# Patient Record
Sex: FEMALE | Race: WHITE | URBAN - METROPOLITAN AREA
[De-identification: names, ages, dates, MRNs, and addresses within clinical notes are randomized per-mention and may not be internally consistent; named-entity substitution may affect disease eponyms.]

---

## 2022-12-27 ENCOUNTER — OFFICE VISIT (OUTPATIENT)
Dept: URBAN - METROPOLITAN AREA CLINIC 30 | Facility: CLINIC | Age: 62
End: 2022-12-27
Payer: COMMERCIAL

## 2022-12-27 DIAGNOSIS — H02.534 EYELID RETRACTION LEFT UPPER EYELID: Primary | ICD-10-CM

## 2022-12-27 PROCEDURE — 92285 EXTERNAL OCULAR PHOTOGRAPHY: CPT | Performed by: OPHTHALMOLOGY

## 2022-12-27 PROCEDURE — 99214 OFFICE O/P EST MOD 30 MIN: CPT | Performed by: OPHTHALMOLOGY

## 2022-12-27 NOTE — IMPRESSION/PLAN
Impression: Eyelid retraction left upper eyelid: H02.534. Plan: HANNAH retraction following trauma and reconstruction at [facility] in late November 2022. 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 Moderate exposure OS. Recommend FTSG to HANNAH from RUL to replace lost anterior lamella. RBAI of surgery d/w patient. All questions answered. cont' PFAT's every 1-2 hours OS and tear gel brandi TID.

## 2023-03-07 ENCOUNTER — ADULT PHYSICAL (OUTPATIENT)
Dept: URBAN - METROPOLITAN AREA CLINIC 24 | Facility: CLINIC | Age: 63
End: 2023-03-07
Payer: COMMERCIAL

## 2023-03-07 DIAGNOSIS — H02.534 EYELID RETRACTION LEFT UPPER EYELID: ICD-10-CM

## 2023-03-07 DIAGNOSIS — Z01.818 ENCOUNTER FOR OTHER PREPROCEDURAL EXAMINATION: Primary | ICD-10-CM

## 2023-03-07 PROCEDURE — 99203 OFFICE O/P NEW LOW 30 MIN: CPT | Performed by: PHYSICIAN ASSISTANT

## 2023-03-23 ENCOUNTER — POST-OPERATIVE VISIT (OUTPATIENT)
Dept: URBAN - METROPOLITAN AREA CLINIC 24 | Facility: CLINIC | Age: 63
End: 2023-03-23
Payer: COMMERCIAL

## 2023-03-23 DIAGNOSIS — Z48.89 ENCOUNTER FOR OTHER SPECIFIED SURGICAL AFTERCARE: Primary | ICD-10-CM

## 2023-03-23 PROCEDURE — 99024 POSTOP FOLLOW-UP VISIT: CPT | Performed by: OPHTHALMOLOGY

## 2023-03-23 NOTE — IMPRESSION/PLAN
Impression: S/P Full thickness Eyelid graft- Left Upper Lid OS - 6 Days. Encounter for other specified surgical aftercare  Z48.89. Plan: healing well. no signs of infection. graft alive. Post-op instructions reviewed. plastic shield at all times. 

RTC 10-14 days or sooner PRN for suture removal.

## 2023-04-06 ENCOUNTER — POST-OPERATIVE VISIT (OUTPATIENT)
Dept: URBAN - METROPOLITAN AREA CLINIC 24 | Facility: CLINIC | Age: 63
End: 2023-04-06
Payer: COMMERCIAL

## 2023-04-06 DIAGNOSIS — Z48.89 ENCOUNTER FOR OTHER SPECIFIED SURGICAL AFTERCARE: Primary | ICD-10-CM

## 2023-04-06 PROCEDURE — 99024 POSTOP FOLLOW-UP VISIT: CPT | Performed by: OPHTHALMOLOGY

## 2023-04-06 RX ORDER — ERYTHROMYCIN 5 MG/G
OINTMENT OPHTHALMIC
Qty: 2 | Refills: 0 | Status: ACTIVE
Start: 2023-04-06

## 2023-04-06 NOTE — IMPRESSION/PLAN
Impression: S/P Full thickness Eyelid graft- Left Upper Lid OS - 20 Days. Encounter for other specified surgical aftercare  Z48.89. Plan: healing well. mild nodularity along graft border. Will likely require kenalog and 5-FU injection. RTC 3 weeks or sooner PRN. RUL sutures removed today.

## 2023-05-04 ENCOUNTER — POST-OPERATIVE VISIT (OUTPATIENT)
Dept: URBAN - METROPOLITAN AREA CLINIC 24 | Facility: CLINIC | Age: 63
End: 2023-05-04
Payer: COMMERCIAL

## 2023-05-04 DIAGNOSIS — Z48.89 ENCOUNTER FOR OTHER SPECIFIED SURGICAL AFTERCARE: Primary | ICD-10-CM

## 2023-05-04 PROCEDURE — 99024 POSTOP FOLLOW-UP VISIT: CPT | Performed by: OPHTHALMOLOGY

## 2023-05-04 NOTE — IMPRESSION/PLAN
Impression: S/P Full thickness Eyelid graft- Left Upper Lid OS - 48 Days. Encounter for other specified surgical aftercare  Z48.89. Plan: mild graft contracture and rigid/nodular scar tissue along graft edges. RBAI of kenalog and 5-FU d/ w patient today. All questions answered. graft injected without complication. RTC 1 month or sooner PRN.

## 2023-06-01 ENCOUNTER — OFFICE VISIT (OUTPATIENT)
Dept: URBAN - METROPOLITAN AREA CLINIC 24 | Facility: CLINIC | Age: 63
End: 2023-06-01
Payer: COMMERCIAL

## 2023-06-01 DIAGNOSIS — H02.534 EYELID RETRACTION LEFT UPPER EYELID: Primary | ICD-10-CM

## 2023-06-01 PROCEDURE — 99213 OFFICE O/P EST LOW 20 MIN: CPT | Performed by: OPHTHALMOLOGY

## 2023-06-01 NOTE — IMPRESSION/PLAN
Impression: Eyelid retraction left upper eyelid: H02.534. Plan: s/p FTSG HANNAH. no lag when brows relaxed. corneal surface in excellent condition; significant improvement. will allow lid to continue healing on its own. cont' PFATS every 2-3 hours and tear gel brandi qhs.  

RTC 4 months

## 2023-10-05 ENCOUNTER — OFFICE VISIT (OUTPATIENT)
Dept: URBAN - METROPOLITAN AREA CLINIC 24 | Facility: CLINIC | Age: 63
End: 2023-10-05
Payer: COMMERCIAL

## 2023-10-05 DIAGNOSIS — H02.534 EYELID RETRACTION LEFT UPPER EYELID: Primary | ICD-10-CM

## 2023-10-05 PROCEDURE — 92012 INTRM OPH EXAM EST PATIENT: CPT | Performed by: OPHTHALMOLOGY

## 2024-08-15 ENCOUNTER — OFFICE VISIT (OUTPATIENT)
Dept: URBAN - METROPOLITAN AREA CLINIC 24 | Facility: CLINIC | Age: 64
End: 2024-08-15
Payer: COMMERCIAL

## 2024-08-15 DIAGNOSIS — H25.13 AGE-RELATED NUCLEAR CATARACT, BILATERAL: ICD-10-CM

## 2024-08-15 DIAGNOSIS — H52.4 PRESBYOPIA: ICD-10-CM

## 2024-08-15 DIAGNOSIS — D31.32 BENIGN NEOPLASM OF LEFT CHOROID: ICD-10-CM

## 2024-08-15 DIAGNOSIS — D31.31 BENIGN NEOPLASM OF RIGHT CHOROID: ICD-10-CM

## 2024-08-15 DIAGNOSIS — Z79.84 LONG TERM (CURRENT) USE OF ORAL ANTIDIABETIC DRUGS: ICD-10-CM

## 2024-08-15 DIAGNOSIS — E11.9 DIABETES MELLITUS TYPE 2 WITHOUT MENTION OF COMPLICATION: Primary | ICD-10-CM

## 2024-08-15 PROCEDURE — 99204 OFFICE O/P NEW MOD 45 MIN: CPT | Performed by: STUDENT IN AN ORGANIZED HEALTH CARE EDUCATION/TRAINING PROGRAM

## 2024-08-15 PROCEDURE — 92250 FUNDUS PHOTOGRAPHY W/I&R: CPT | Performed by: STUDENT IN AN ORGANIZED HEALTH CARE EDUCATION/TRAINING PROGRAM

## 2024-08-15 ASSESSMENT — KERATOMETRY
OD: 46.30
OS: 46.42

## 2024-08-15 ASSESSMENT — VISUAL ACUITY
OD: 20/20
OS: 20/25

## 2024-08-15 ASSESSMENT — INTRAOCULAR PRESSURE
OS: 18
OD: 16

## 2025-08-18 ENCOUNTER — OFFICE VISIT (OUTPATIENT)
Dept: URBAN - METROPOLITAN AREA CLINIC 24 | Facility: CLINIC | Age: 65
End: 2025-08-18
Payer: COMMERCIAL

## 2025-08-18 DIAGNOSIS — D31.31 BENIGN NEOPLASM OF RIGHT CHOROID: ICD-10-CM

## 2025-08-18 DIAGNOSIS — H35.371 PUCKERING OF MACULA, RIGHT EYE: ICD-10-CM

## 2025-08-18 DIAGNOSIS — E11.9 DIABETES MELLITUS TYPE 2 WITHOUT MENTION OF COMPLICATION: Primary | ICD-10-CM

## 2025-08-18 DIAGNOSIS — D31.32 BENIGN NEOPLASM OF LEFT CHOROID: ICD-10-CM

## 2025-08-18 DIAGNOSIS — Z79.84 LONG TERM (CURRENT) USE OF ORAL ANTIDIABETIC DRUGS: ICD-10-CM

## 2025-08-18 PROCEDURE — 92250 FUNDUS PHOTOGRAPHY W/I&R: CPT | Performed by: STUDENT IN AN ORGANIZED HEALTH CARE EDUCATION/TRAINING PROGRAM

## 2025-08-18 PROCEDURE — 92012 INTRM OPH EXAM EST PATIENT: CPT | Performed by: STUDENT IN AN ORGANIZED HEALTH CARE EDUCATION/TRAINING PROGRAM

## 2025-08-18 ASSESSMENT — INTRAOCULAR PRESSURE
OS: 16
OD: 15

## 2025-08-18 ASSESSMENT — KERATOMETRY
OS: 46.10
OD: 46.10

## 2025-08-18 ASSESSMENT — VISUAL ACUITY
OS: 20/30
OD: 20/25